# Patient Record
Sex: FEMALE | Race: WHITE | NOT HISPANIC OR LATINO | Employment: PART TIME | URBAN - METROPOLITAN AREA
[De-identification: names, ages, dates, MRNs, and addresses within clinical notes are randomized per-mention and may not be internally consistent; named-entity substitution may affect disease eponyms.]

---

## 2021-04-05 ENCOUNTER — OFFICE VISIT (OUTPATIENT)
Dept: NEUROSURGERY | Facility: CLINIC | Age: 41
End: 2021-04-05

## 2021-04-05 VITALS
WEIGHT: 189.2 LBS | TEMPERATURE: 97.1 F | BODY MASS INDEX: 34.82 KG/M2 | DIASTOLIC BLOOD PRESSURE: 80 MMHG | HEIGHT: 62 IN | SYSTOLIC BLOOD PRESSURE: 108 MMHG

## 2021-04-05 DIAGNOSIS — G89.29 CHRONIC SI JOINT PAIN: ICD-10-CM

## 2021-04-05 DIAGNOSIS — M54.16 LUMBAR NERVE ROOT COMPRESSION: ICD-10-CM

## 2021-04-05 DIAGNOSIS — M53.3 CHRONIC SI JOINT PAIN: ICD-10-CM

## 2021-04-05 DIAGNOSIS — G89.29 CHRONIC LEFT-SIDED LOW BACK PAIN WITH LEFT-SIDED SCIATICA: Primary | ICD-10-CM

## 2021-04-05 DIAGNOSIS — M54.42 CHRONIC LEFT-SIDED LOW BACK PAIN WITH LEFT-SIDED SCIATICA: Primary | ICD-10-CM

## 2021-04-05 DIAGNOSIS — M51.36 DDD (DEGENERATIVE DISC DISEASE), LUMBAR: ICD-10-CM

## 2021-04-05 PROCEDURE — 99204 OFFICE O/P NEW MOD 45 MIN: CPT | Performed by: PHYSICIAN ASSISTANT

## 2021-04-05 RX ORDER — DICLOFENAC SODIUM 75 MG/1
75 TABLET, DELAYED RELEASE ORAL 2 TIMES DAILY PRN
COMMUNITY
Start: 2020-12-29 | End: 2021-04-05 | Stop reason: ALTCHOICE

## 2021-04-05 RX ORDER — DICLOFENAC SODIUM AND MISOPROSTOL 75; 200 MG/1; UG/1
1 TABLET, DELAYED RELEASE ORAL 2 TIMES DAILY
COMMUNITY

## 2021-04-05 RX ORDER — APREMILAST 30 MG/1
30 TABLET, FILM COATED ORAL 2 TIMES DAILY
COMMUNITY

## 2021-04-05 NOTE — PROGRESS NOTES
Patient: Kell Clarke  : 1980  Chart #: [id number]    Date of Service: 2021    Chief Complaint   Patient presents with   • Back Pain     new pt   • Leg Pain     HPI  Chronic LBP for 4 years, getting worse.  She works for [employer] and sits all day long.  She reports that she has back pain constantly, it radiates to the left hip and randomly throughout the day and night she will get sharp shooting electrical shocks going down her entire left leg all the way to the toes.  She does state her low back pain and left hip pain is worse than her leg pain.  The patient did go to physical therapy for 6 visits.  She went from  to 2020 and she went 2 days a week, therapy did not provide any relief for her back or leg pain.  Her PCP has tried her on 3 different anti-inflammatory medications without significant improvement.    Chronic illnesses:  Osteoarthritis of the right knee  Obesity  Chronic low back pain  Hypertension    Past Medical History:   Diagnosis Date   • Anemia    • Arthritis    • Bladder infection    • Cancer (CMS/HCC)    • Gout    • Headache    • Hypertension    • Low back pain        Allergies   Allergen Reactions   • Morphine And Related          Current Outpatient Medications:   •  Apremilast (Otezla) 30 MG tablet, Take 30 mg by mouth 2 (Two) Times a Day., Disp: , Rfl:   •  diclofenac-miSOPROStol (ARTHROTEC 75) 75-0.2 MG EC tablet, Take 1 tablet by mouth 2 (Two) Times a Day., Disp: , Rfl:   •  celecoxib (CeleBREX) 200 MG capsule, , Disp: , Rfl:   •  cetirizine (ZyrTEC) 10 MG tablet, , Disp: , Rfl:   •  esomeprazole (NexIUM) 40 MG capsule, , Disp: , Rfl:   •  gabapentin (NEURONTIN) 400 MG capsule, , Disp: , Rfl:   •  meloxicam (MOBIC) 15 MG tablet, , Disp: , Rfl:   •  NEXIUM 24HR 20 MG capsule, , Disp: , Rfl:   •  traMADol (ULTRAM) 50 MG tablet, , Disp: , Rfl:     Social History     Socioeconomic History   • Marital status:      Spouse name: Not on file   • Number of children:  "Not on file   • Years of education: Not on file   • Highest education level: Not on file   Tobacco Use   • Smoking status: Never Smoker   Substance and Sexual Activity   • Alcohol use: No   • Drug use: No   • Sexual activity: Defer       Family History   Problem Relation Age of Onset   • Arthritis Mother    • Asthma Mother    • Hypertension Mother    • Asthma Father    • Asthma Sister    • Cancer Sister    • Diabetes Sister    • Kidney disease Maternal Uncle    • Hypertension Maternal Grandmother    • Diabetes Maternal Grandfather    • Hypertension Maternal Grandfather    • Birth defects Paternal Grandmother    • Hypertension Paternal Grandmother    • Hypertension Paternal Grandfather        Review of Systems   Constitutional: Positive for chills and unexpected weight change.   HENT: Positive for facial swelling and hearing loss.    Eyes: Negative.    Cardiovascular: Positive for leg swelling.   Gastrointestinal: Positive for abdominal pain.   Endocrine: Negative.    Genitourinary: Positive for pelvic pain.   Musculoskeletal: Positive for back pain and joint swelling.   Skin: Negative.    Allergic/Immunologic: Positive for food allergies.   Neurological: Positive for numbness and headaches.   Hematological: Bruises/bleeds easily.   Psychiatric/Behavioral: Positive for agitation, confusion and sleep disturbance. The patient is nervous/anxious.        Patient's Body mass index is 34.61 kg/m². BMI is above normal parameters. Recommendations include: exercise counseling, nutrition counseling and referral to primary care.  Patient reports that she is currently watching her food intake and aware of her weight and the need to lose some pounds.       Social History    Tobacco Use      Smoking status: Never Smoker       Physical examination:  Blood pressure 108/80, temperature 97.1 °F (36.2 °C), height 157.5 cm (62\"), weight 85.8 kg (189 lb 3.2 oz).  HEENT- normocephalic, atraumatic, sclera clear  Lungs-normal expansion, no " wheezing  Heart-regular rate and rhythm  Extremities-positive pulses, no edema    Neurologic Exam    WDWN white female  A/A/C, speech clear, attention normal, conversant, answers questions appropriately, good historian.  Cranial nerves II through XII are intact  Motor examination does not reveal weakness in the  lower extremities.   Sensation is intact.  Gait is normal, balance is normal.  She is able to walk on her toes and walk on her heels without evidence of weakness or difficulties with balance.  No tremors are noted.  Reflexes are intact except an absent left Achilles  Straight leg raising causes low back pain  Palpation of the paraspinal muscles reveals normal spinal alignment, mild tenderness to deep palpation in the lower back and left hip.    Radiographic Imaging:  I have personally reviewed imaging and outside results. I have independently interpreted the imaging and discussed with the patient the findings and appropriate management.  MRI scan from January 2021 reveals mild degenerative disc disease at L4-5 and L5-S1.  There is foraminal narrowing noted at L5-S1 on the left with mild facet hypertrophy.      Medical Decision Making  Assessment and Plan:  1.  Lumbar spondylosis  2.  Degenerative disc disease L4-5 and L5-S1  3.  Left S1 nerve root compression  4.  At the do believe the patient would benefit from [facility] exercise program through physical therapy and I have made a referral.  5.  The patient will continue with the diclofenac medications at this time and I have asked her to add Tylenol 3 times a day, 1000 mg.  6.  I am making a referral for the patient to see pain management for a facet block at L5-S1 on the left and epidural steroid injection.  7.  We will follow up in approximately 1 month in the [facility].    Ioana Snyder PA-C      Patient Care Team:  Aime Aburto APRN as PCP - General (Family Medicine)  Effie Hansen APRN as Referring Physician (Nurse Practitioner)